# Patient Record
Sex: FEMALE
[De-identification: names, ages, dates, MRNs, and addresses within clinical notes are randomized per-mention and may not be internally consistent; named-entity substitution may affect disease eponyms.]

---

## 2019-03-22 ENCOUNTER — HOSPITAL ENCOUNTER (OUTPATIENT)
Dept: HOSPITAL 56 - MW.OBCHECK | Age: 29
Setting detail: OBSERVATION
LOS: 3 days | Discharge: HOME | End: 2019-03-25
Attending: OBSTETRICS & GYNECOLOGY | Admitting: OBSTETRICS & GYNECOLOGY
Payer: MEDICAID

## 2019-03-22 DIAGNOSIS — Z3A.40: ICD-10-CM

## 2019-03-22 PROCEDURE — 85027 COMPLETE CBC AUTOMATED: CPT

## 2019-03-22 PROCEDURE — 86850 RBC ANTIBODY SCREEN: CPT

## 2019-03-22 PROCEDURE — 86900 BLOOD TYPING SEROLOGIC ABO: CPT

## 2019-03-22 PROCEDURE — 85460 HEMOGLOBIN FETAL: CPT

## 2019-03-22 PROCEDURE — 86901 BLOOD TYPING SEROLOGIC RH(D): CPT

## 2019-03-22 PROCEDURE — 59514 CESAREAN DELIVERY ONLY: CPT

## 2019-03-22 PROCEDURE — 85014 HEMATOCRIT: CPT

## 2019-03-22 PROCEDURE — 85018 HEMOGLOBIN: CPT

## 2019-03-22 PROCEDURE — 36415 COLL VENOUS BLD VENIPUNCTURE: CPT

## 2019-03-22 PROCEDURE — 59025 FETAL NON-STRESS TEST: CPT

## 2019-03-23 RX ADMIN — BUTORPHANOL TARTRATE PRN MG: 1 INJECTION INTRAMUSCULAR; INTRAVENOUS at 01:22

## 2019-03-23 RX ADMIN — BUTORPHANOL TARTRATE PRN MG: 1 INJECTION INTRAMUSCULAR; INTRAVENOUS at 02:36

## 2019-03-23 NOTE — OR
SURGEON:

Jj Hall MD

 

DATE OF PROCEDURE:

 

PREOPERATIVE DIAGNOSIS:

Intrauterine pregnancy, 40 weeks, failure to progress and failure to descend.

 

POSTOPERATIVE DIAGNOSIS:

Intrauterine pregnancy, 40 weeks, failure to progress and failure to descend.

 

OPERATION PERFORMED:

Primary low-transverse  section.

 

ASSISTANT:

OR tech.

 

ANESTHESIA:

Epidural.

 

ANESTHESIOLOGIST:

Dr. Brody Gutierrez and Dr. Martinez.

 

ESTIMATED BLOOD LOSS:

700 mL.

 

COMPLICATIONS:

None.

 

INDICATIONS FOR SURGERY:

This patient is term.  She is followed in the office primarily by me.  She is

admitted in active labor.  At the time of admission, she was 4 cm complete

vertex with bulging bag of water.  She is adry regularly.  The patient

had epidural anesthesia for labor analgesia and she had an artificial membrane

with clear fluid.  The patient progressed rather slowly.  She required Pitocin

augmentation.  However, at 04:00, the patient was complete zero station and she

pushed in excess of 2 hours without any descent of the fetus, so possibility of

CPD and failure to progress, entered pain, and I discussed with the patient and

we elected to have a primary low-transverse  section.

 

PROCEDURE IN DETAIL:

The patient brought to the OR, properly identified.  The patient was brought to

the with a Gayle catheter in the bladder and adequate level of epidural

anesthesia.  The patient prepped and draped in sterile fashion as usual.  Low

transverse Pfannenstiel skin incision was done.  Ricardo fascia and rectus fascia

opened in the direction of the incision.  The 2 recti muscles were  and

peritoneal cavity was entered.  Bladder flap was raised in the usual manner

pushing the bladder away from the lower uterine segment.  Low transverse uterine

incision extended manually and fetus was in the vertex position, delivered

without any problem, and handed to the pediatrician, Dr. Luke who was present at

the time of the delivery.  The fetus cried immediately.  Apgar score reported to

be 8 and 9, and the weight __________ and the placenta delivered spontaneous

complete and intact and repair of the lower uterine segment was done with 2-0

Vicryl continuous interlocking in 2 layers.  Reperitonealization done with 3-0

Vicryl continuous and then the peritoneal cavity evacuated completely from all

blood and blood clot and closed with 3-0 Vicryl continuous.  The rectus fascia

was closed with #1 PDS double strand continuous.  The Ricardo's fascia with 3-0

Vicryl continuous and the skin closed with skin clips, Insorb, and Dermabond.

Instrument and sponge counts were correct x2.  The patient tolerated the

procedure well, went through recovery room in stable general condition.

 

 

AUGUSTINE RHODES

DD:  2019 19:57:05

DT:  2019 20:21:03

Job #:  [id number]

## 2019-03-23 NOTE — PCM.LDHP
L&D History of Present Illness





- General


Date of Service: 03/23/19


Admit Problem/Dx: 


 Patient Status Order with Admit Dx/Problem





03/22/19 20:34


Patient Status [ADT] Routine 








 Admission Diagnosis/Problem











Admission Diagnosis/Problem    Pregnancy-related examination














Source of Information: Patient


History Limitations: Reports: No Limitations





- History of Present Illness


Pain Score: 10


Improves with: Reports: None


Worsens with: Reports: None


Associated Symptoms: Reports: N





- Related Data


Allergies/Adverse Reactions: 


 Allergies











Allergy/AdvReac Type Severity Reaction Status Date / Time


 


No Known Allergies Allergy   Verified 03/22/19 00:08














Past Medical History





- Past Health History


Medical/Surgical History: Denies Medical/Surgical History





- Past Surgical History


HEENT Surgical History: Reports: LASIK


Other HEENT Surgeries/Procedures: Jan 2017





Social & Family History





- Family History


Family Medical History: Noncontributory





- Tobacco Use


Smoking Status *Q: Never Smoker


Second Hand Smoke Exposure: No





- Caffeine Use


Caffeine Use: Reports: Soda





- Recreational Drug Use


Recreational Drug Use: No





H&P Review of Systems





- Review of Systems:


Review Of Systems: See Below


General: Reports: No Symptoms


HEENT: Reports: No Symptoms


Pulmonary: Reports: No Symptoms


Cardiovascular: Reports: No Symptoms


Gastrointestinal: Reports: No Symptoms


Genitourinary: Reports: No Symptoms


Musculoskeletal: Reports: No Symptoms


Skin: Reports: No Symptoms


Psychiatric: Reports: No Symptoms


Neurological: Reports: No Symptoms


Hematologic/Lymphatic: Reports: No Symptoms


Immunologic: Reports: No Symptoms





L&D Exam





- Exam


Exam: See Below





- Vital Signs


Weight: 81.647 kg





- OB Specific


Fundal Height In cm: 38


Contraction Intensity: Moderate


Fetal Movement: Active


Fetal Heart Tones: Present


Birth Presentation: Vertex





- Tracey Score


Tracey Score Cervix Position: Anterior


Tracey Score Effacement: >80%


Tracey Score Dilation: > 5 cm


Tracey Score Infant's Station: -3





- Exam


General: Alert, Oriented


HEENT: PERRLA, Conjunctiva Clear, EACs Clear, EOMI, Hearing Intact, Mucosa 

Moist & Pink, Nares Patent, Normal Nasal Septum, Posterior Pharynx Clear, TMs 

Clear


Neck: Supple, Trachea Midline


Lungs: Clear to Auscultation, Normal Respiratory Effort


Cardiovascular: Regular Rate, Regular Rhythm


GI/Abdominal Exam: Normal Bowel Sounds, Soft, Non-Tender, No Organomegaly, No 

Distention, No Abnormal Bruit, No Mass, Pelvis Stable


Rectal Exam: Normal Exam, Normal Rectal Tone


Genitourinary: Normal external exam, Normal bimanual exam, Normal speculum exam


Back Exam: Normal Inspection, Full Range of Motion


Extremities: Normal Inspection, Normal Range of Motion, Non-Tender, No Pedal 

Edema, Normal Capillary Refill


Skin: Warm, Dry, Intact


Neurological: Cranial Nerves Intact, Reflexes Equal Bilateral


Psychiatric: Alert, Normal Affect, Normal Mood





- Patient Data


Lab Results Last 24 hrs: 


 Laboratory Results - last 24 hr











  03/22/19 03/22/19 Range/Units





  21:00 21:00 


 


WBC  6.88   (4.0-11.0)  K/uL


 


RBC  3.86 L   (4.30-5.90)  M/uL


 


Hgb  11.5 L   (12.0-16.0)  g/dL


 


Hct  33.9 L   (36.0-46.0)  %


 


MCV  87.8   (80.0-98.0)  fL


 


MCH  29.8   (27.0-32.0)  pg


 


MCHC  33.9   (31.0-37.0)  g/dL


 


RDW Std Deviation  43.3   (28.0-62.0)  fl


 


RDW Coeff of Joss  14   (11.0-15.0)  %


 


Plt Count  187   (150-400)  K/uL


 


MPV  10.80   (7.40-12.00)  fL


 


Nucleated RBC %  0.0   /100WBC


 


Nucleated RBCs #  0   K/uL


 


Blood Type   O NEGATIVE  


 


Antibody Screen   NEGATIVE  











Result Diagrams: 


 03/22/19 21:00





Imaging Impressions Last 24 hrs: 





Term pregnancy in active labor.


Problem List Initiated/Reviewed/Updated: Yes


Orders Last 24hrs: 


 Active Orders 24 hr











 Category Date Time Status


 


 Patient Status [ADT] Routine ADT  03/22/19 20:34 Active


 


 Bedrest Bathroom Privileges [RC] ASDIRECTED Care  03/22/19 20:34 Active


 


 Communication Order [RC] ASDIRECTED Care  03/22/19 20:34 Active


 


 Communication Order [RC] ASDIRECTED Care  03/22/19 20:34 Active


 


 Communication Order [RC] ASDIRECTED Care  03/22/19 20:34 Active


 


 Fetal Non Stress Test [RC] PER UNIT ROUTINE Care  03/22/19 20:34 Active


 


 May Shower [RC] ASDIRECTED Care  03/22/19 20:34 Active


 


 Notify Provider [RC] PRN Care  03/22/19 20:34 Active


 


 Notify Provider [RC] PRN Care  03/22/19 20:34 Active


 


 Notify Provider [RC] PRN Care  03/22/19 20:34 Active


 


 Notify Provider [RC] STAT Care  03/22/19 20:34 Active


 


 Oxygen Therapy [RC] ASDIRECTED Care  03/22/19 20:34 Active


 


 Up ad Valerie [RC] ASDIRECTED Care  03/22/19 20:34 Active


 


 Vaginal Exam [RC] PRN Care  03/22/19 20:34 Active


 


 Vital Signs [RC] PER UNIT ROUTINE Care  03/22/19 20:34 Active


 


 Clear Liquid Diet [DIET] Diet  03/23/19 Breakfast Active


 


 Butorphanol [Stadol] Med  03/22/19 20:34 Active





 1 mg IVPUSH ASDIRECTED PRN   


 


 Carboprost Tromethamine [Hemabate DS] Med  03/22/19 20:34 Active





 250 mcg IM ASDIRECTED PRN   


 


 Lactated Ringers [Ringers, Lactated] 1,000 ml Med  03/22/19 20:45 Active





 IV ASDIRECTED   


 


 Lidocaine 1% [Xylocaine 1%] Med  03/22/19 20:34 Active





 50 ml INJECT ONETIME PRN   


 


 Methylergonovine [Methergine] Med  03/22/19 20:34 Active





 0.2 mg IM ASDIRECTED PRN   


 


 Nalbuphine [Nubain] Med  03/22/19 20:34 Active





 10 mg IVPUSH ASDIRECTED PRN   


 


 Oxytocin/0.9 % Sodium Chloride [Oxytocin 30 Unit/500 ML Med  03/22/19 20:45 

Active





 -NS]   





 30 unit in 500 ml IV TITRATE   


 


 Oxytocin/0.9 % Sodium Chloride [Oxytocin 30 Unit/500 ML Med  03/22/19 20:45 

Active





 -NS]   





 30 unit in 500 ml IV TITRATE   


 


 Sodium Chloride 0.9% [Normal Saline] Med  03/22/19 20:34 Active





 10 ml IV ASDIRECTED PRN   


 


 Sodium Chloride 0.9% [Saline Flush] Med  03/22/19 20:34 Active





 10 ml FLUSH ASDIRECTED PRN   


 


 Terbutaline [Brethine] Med  03/22/19 20:34 Active





 0.25 mg SUBCUT ASDIRECTED PRN   


 


 Tranexamic Acid [Cyklokapron] 1,000 mg Med  03/22/19 20:34 Active





 Sodium Chloride 0.9% [Normal Saline] 100 ml   





 IV ONETIME   


 


 Water For Irrigation,Sterile [Sterile Water for Med  03/22/19 20:34 Active





 Irrigation]   





 1,000 ml IRR ASDIRECTED PRN   


 


 miSOPROStol [Cytotec] Med  03/22/19 20:34 Active





 200 mcg PO ONETIME PRN   


 


 miSOPROStol [Cytotec] Med  03/22/19 20:34 Active





 25 mcg VAG ONETIME PRN   


 


 Medication Administration Instruction [OM.PC] Q3H Oth  03/22/19 20:45 Ordered


 


 Peripheral IV Insertion Adult [OM.PC] Routine Oth  03/22/19 20:34 Ordered


 


 Resuscitation Status Routine Resus Stat  03/22/19 20:34 Ordered








 Medication Orders





Butorphanol Tartrate (Stadol)  1 mg IVPUSH ASDIRECTED PRN


   PRN Reason: Pain


   Last Admin: 03/23/19 02:36  Dose: 1 mg


   Admin: 03/23/19 01:22  Dose: 1 mg


Carboprost Tromethamine (Hemabate Ds)  250 mcg IM ASDIRECTED PRN


   PRN Reason: Post Partum Hemorrhage


Lactated Ringer's (Ringers, Lactated)  1,000 mls @ 150 mls/hr IV ASDIRECTED MARCIO


   Last Admin: 03/23/19 02:36  Dose: 999 mls/hr


   Infusion: 03/23/19 02:36  Dose: 150 mls/hr


   Admin: 03/23/19 01:13  Dose: 150 mls/hr


Oxytocin/Sodium Chloride (Oxytocin 30 Unit/500 Ml-Ns)  30 unit in 500 mls @ 999 

mls/hr IV TITRATE MARCIO


Oxytocin/Sodium Chloride (Oxytocin 30 Unit/500 Ml-Ns)  30 unit in 500 mls @ 2 

mls/hr IV TITRATE MARCIO; Protocol


Tranexamic Acid 1,000 mg/ (Sodium Chloride)  110 mls @ 660 mls/hr IV ONETIME PRN


   PRN Reason: Bleeding


Lidocaine HCl (Xylocaine 1%)  50 ml INJECT ONETIME PRN


   PRN Reason: Laceration repair


Methylergonovine Maleate (Methergine)  0.2 mg IM ASDIRECTED PRN


   PRN Reason: Post Partum Hemorrhage


Misoprostol (Cytotec)  200 mcg PO ONETIME PRN


   PRN Reason: Post Partum Hemorrhage


Misoprostol (Cytotec)  25 mcg VAG ONETIME PRN


   PRN Reason: Cervical Ripening


   Last Admin: 03/22/19 21:13  Dose: 25 mcg


Nalbuphine HCl (Nubain)  10 mg IVPUSH ASDIRECTED PRN


   PRN Reason: Pain (severe 7-10)


Sodium Chloride (Saline Flush)  10 ml FLUSH ASDIRECTED PRN


   PRN Reason: Keep Vein Open


Sodium Chloride (Normal Saline)  10 ml IV ASDIRECTED PRN


   PRN Reason: IV Use


Sterile Water (Sterile Water For Irrigation)  1,000 ml IRR ASDIRECTED PRN


   PRN Reason: delivery


Terbutaline Sulfate (Brethine)  0.25 mg SUBCUT ASDIRECTED PRN


   PRN Reason: Tacysystole

## 2019-03-23 NOTE — PCM.PRNOTE
- Free Text/Narrative


Note: 





 active labor, dilated 4cm requesting labor analgesia.


Chart reviewed, discussed procedure, expectations, risks and benefits. ? 

answered. Permit signed.


Fluid bolus: 1900ml


03:20 Prep with chloroprep.


03:23 Skin local 5ml 1% lido @L4-5


03:27 Epidural space on 2nd attempt.  ID'd with 1.5ml saline/1ml air. 3ml 

saline bolus given


03:29 Catheter to 8cm without issues.  Negative aspiration.


03:30 Test dose 5ml 1.5% Lidocaine with 1:200K epi.  TEST NEGATIVE  Occlusive 

drsg/catheter secured.


03:33-7 Epidural bolus with 0.2% Naropin 8ml with Fentanyl 100mcg added


03:52 Infusion started @ 8ml/hr with 4ml/bolus/q10x4/hr.


Pain well controlled.  Resting quietly. Vital signs/FHT stable.


Tolerated procedure well.  No problems noted.

## 2019-03-23 NOTE — PCM.PREANE
Preanesthetic Assessment





- Procedure


Proposed Procedure: 





Labor Epidural





- Anesthesia/Transfusion/Family Hx


Anesthesia History: No Prior Anesthesia


Family History of Anesthesia Reaction: No


Transfusion History: No Prior Transfusion(s)


Intubation History: Unknown





- Review of Systems


General: No Symptoms


Pulmonary: No Symptoms


Cardiovascular: No Symptoms


Gastrointestinal: No Symptoms


Neurological: No Symptoms


Other: Reports: None





- Physical Assessment


NPO Status Date: 03/23/19


NPO Status Time: 03:00


Pulse: 88


O2 Sat by Pulse Oximetry: 99


Respiratory Rate: 20


Blood Pressure: 136/78


Height: 1.68 m


Weight: 81.647 kg


ASA Class: 2 (Acceptable patient for Labor Analgesia.)





- Lab


Values: 





 Laboratory Last Values











WBC  6.88 K/uL (4.0-11.0)   03/22/19  21:00    


 


RBC  3.86 M/uL (4.30-5.90)  L  03/22/19  21:00    


 


Hgb  11.5 g/dL (12.0-16.0)  L  03/22/19  21:00    


 


Hct  33.9 % (36.0-46.0)  L  03/22/19  21:00    


 


MCV  87.8 fL (80.0-98.0)   03/22/19  21:00    


 


MCH  29.8 pg (27.0-32.0)   03/22/19  21:00    


 


MCHC  33.9 g/dL (31.0-37.0)   03/22/19  21:00    


 


RDW Std Deviation  43.3 fl (28.0-62.0)   03/22/19  21:00    


 


RDW Coeff of Joss  14 % (11.0-15.0)   03/22/19  21:00    


 


Plt Count  187 K/uL (150-400)   03/22/19  21:00    


 


MPV  10.80 fL (7.40-12.00)   03/22/19  21:00    


 


Nucleated RBC %  0.0 /100WBC  03/22/19  21:00    


 


Nucleated RBCs #  0 K/uL  03/22/19  21:00    


 


Blood Type  O NEGATIVE   03/22/19  21:00    


 


Antibody Screen  NEGATIVE   03/22/19  21:00    














- Allergies


Allergies/Adverse Reactions: 


 Allergies











Allergy/AdvReac Type Severity Reaction Status Date / Time


 


No Known Allergies Allergy   Verified 03/22/19 00:08














PreAnesthesia Questionnaire





- Past Health History


Medical/Surgical History: Denies Medical/Surgical History





- Past Surgical History


HEENT Surgical History: Reports: LASIK


Other HEENT Surgeries/Procedures: Jan 2017





- SUBSTANCE USE


Smoking Status *Q: Never Smoker


Tobacco Use Within Last Twelve Months: No


Second Hand Smoke Exposure: No


Recreational Drug Use History: No





- CURRENT (IN HOUSE) MEDS


Current Meds: 





 Current Medications





Butorphanol Tartrate (Stadol)  1 mg IVPUSH ASDIRECTED PRN


   PRN Reason: Pain


   Last Admin: 03/23/19 02:36 Dose:  1 mg


Carboprost Tromethamine (Hemabate Ds)  250 mcg IM ASDIRECTED PRN


   PRN Reason: Post Partum Hemorrhage


Lactated Ringer's (Ringers, Lactated)  1,000 mls @ 150 mls/hr IV ASDIRECTED MARCIO


   Last Admin: 03/23/19 03:29 Dose:  999 mls/hr


Oxytocin/Sodium Chloride (Oxytocin 30 Unit/500 Ml-Ns)  30 unit in 500 mls @ 999 

mls/hr IV TITRATE MARCIO


Oxytocin/Sodium Chloride (Oxytocin 30 Unit/500 Ml-Ns)  30 unit in 500 mls @ 2 

mls/hr IV TITRATE MARCIO; Protocol


Tranexamic Acid 1,000 mg/ (Sodium Chloride)  110 mls @ 660 mls/hr IV ONETIME PRN


   PRN Reason: Bleeding


Lidocaine HCl (Xylocaine 1%)  50 ml INJECT ONETIME PRN


   PRN Reason: Laceration repair


Methylergonovine Maleate (Methergine)  0.2 mg IM ASDIRECTED PRN


   PRN Reason: Post Partum Hemorrhage


Misoprostol (Cytotec)  200 mcg PO ONETIME PRN


   PRN Reason: Post Partum Hemorrhage


Misoprostol (Cytotec)  25 mcg VAG ONETIME PRN


   PRN Reason: Cervical Ripening


   Last Admin: 03/22/19 21:13 Dose:  25 mcg


Nalbuphine HCl (Nubain)  10 mg IVPUSH ASDIRECTED PRN


   PRN Reason: Pain (severe 7-10)


Sodium Chloride (Saline Flush)  10 ml FLUSH ASDIRECTED PRN


   PRN Reason: Keep Vein Open


Sodium Chloride (Normal Saline)  10 ml IV ASDIRECTED PRN


   PRN Reason: IV Use


Sterile Water (Sterile Water For Irrigation)  1,000 ml IRR ASDIRECTED PRN


   PRN Reason: delivery


Terbutaline Sulfate (Brethine)  0.25 mg SUBCUT ASDIRECTED PRN


   PRN Reason: Tacysystole





Discontinued Medications





Fentanyl (Sublimaze) Confirm Administered Dose 100 mcg .ROUTE .STK-MED ONE


   Stop: 03/23/19 03:09


Ropivacaine (Naropin 0.2%) Confirm Administered Dose 100 mls @ as directed 

.ROUTE .STK-MED ONE


   Stop: 03/23/19 03:10


Lidocaine/Epinephrine (Lidocaine 1.5%-Epi 1:200,000) Confirm Administered Dose 

5 ml IJ .STK-MED ONE


   Stop: 03/23/19 03:10


Misoprostol (Cytotec)  25 mcg PO ONETIME ONE


   Stop: 03/22/19 20:40


   Last Admin: 03/22/19 21:12 Dose:  25 mcg


Ropivacaine (Naropin 0.2%) Confirm Administered Dose 20 ml .ROUTE .STK-MED ONE


   Stop: 03/23/19 03:09

## 2019-03-23 NOTE — PCM.POSTAN
POST ANESTHESIA ASSESSMENT





- VITAL SIGNS


Pulse Rate: 85


SaO2: 98


Resp Rate: 18


Blood Pressure: 134/78





- RESPIRATORY


Respiratory Status: Respiratory Rate WNL, Airway Patent, O2 Saturation Stable





- CARDIOVASCULAR


CV Status: Pulse Rate WNL, Blood Pressure Stable





- GASTROINTESTINAL


GI Status: No Symptoms





- PAIN


Pain Score: 0





- POST OP HYDRATION


Hydration Status: Adequate & Stable (patient comfoprtable at this time 0 signs 

or symptoms of anesthesia related problems.)

## 2019-03-23 NOTE — PCM.SN
- Free Text/Narrative


Note: 





called to replace epidural bag and changed dose 12/8/15 minute lockout.  pt 9 cm

## 2019-03-24 RX ADMIN — OXYCODONE HYDROCHLORIDE AND ACETAMINOPHEN PRN TAB: 5; 325 TABLET ORAL at 19:23

## 2019-03-24 RX ADMIN — KETOROLAC TROMETHAMINE SCH: 30 INJECTION, SOLUTION INTRAMUSCULAR at 02:22

## 2019-03-24 RX ADMIN — KETOROLAC TROMETHAMINE SCH MG: 30 INJECTION, SOLUTION INTRAMUSCULAR at 14:04

## 2019-03-24 RX ADMIN — KETOROLAC TROMETHAMINE SCH MG: 30 INJECTION, SOLUTION INTRAMUSCULAR at 20:50

## 2019-03-24 RX ADMIN — KETOROLAC TROMETHAMINE SCH MG: 30 INJECTION, SOLUTION INTRAMUSCULAR at 02:07

## 2019-03-24 RX ADMIN — KETOROLAC TROMETHAMINE SCH MG: 30 INJECTION, SOLUTION INTRAMUSCULAR at 07:49

## 2019-03-24 NOTE — PCM.PNPP
- General Info


Date of Service: 19


Functional Status: Reports: Pain Controlled





- Review of Systems


General: Reports: No Symptoms


HEENT: Reports: No Symptoms


Pulmonary: Reports: No Symptoms


Cardiovascular: Reports: No Symptoms


Gastrointestinal: Reports: No Symptoms


Genitourinary: Reports: No Symptoms


Musculoskeletal: Reports: No Symptoms


Skin: Reports: No Symptoms


Neurological: Reports: No Symptoms


Psychiatric: Reports: No Symptoms





- General Info


Date of Service: 19





- Patient Data


Vital Signs - Most Recent: 


 Last Vital Signs











Temp  36.7 C   19 04:00


 


Pulse  75   19 07:00


 


Resp  16   19 08:00


 


BP  102/51 L  19 07:00


 


Pulse Ox  95   19 08:00











Weight - Most Recent: 81.647 kg


I&O - Last 24 Hours: 


 Intake & Output











 19





 22:59 06:59 14:59


 


Intake Total 2100  2


 


Output Total 400 1250 


 


Balance 1700 -1250 2











Lab Results - Last 24 Hours: 


 Laboratory Results - last 24 hr











  19 Range/Units





  20:39 08:05 


 


Hgb   7.2 L  (12.0-16.0)  g/dL


 


Hct   21.2 L  (36.0-46.0)  %


 


Fetal Screen  NEGATIVE   (NEGATIVE)  


 


RhIG Candidate?  YES   


 


Rhogam Indicated  YES, BABY RH POS H   











Med Orders - Current: 


 Current Medications





Bisacodyl (Dulcolax)  10 mg RECTAL ONETIME PRN


   PRN Reason: Constipation


Butorphanol Tartrate (Stadol)  1 mg IVPUSH ASDIRECTED PRN


   PRN Reason: Pain


   Last Admin: 19 02:36 Dose:  1 mg


Carboprost Tromethamine (Hemabate Ds)  250 mcg IM ASDIRECTED PRN


   PRN Reason: Post Partum Hemorrhage


Diphenhydramine HCl (Benadryl)  25 mg IVPUSH Q4H PRN


   PRN Reason: Itching


   Stop: 19 18:41


Diphenhydramine HCl (Benadryl)  25 mg IVPUSH Q6H PRN


   PRN Reason: Itching or Nausea


Docusate Sodium (Colace)  100 mg PO BID [facility]


   Last Admin: 19 09:45 Dose:  100 mg


Emollient Ointment (Lansinoh Hpa)  0 gm TOP ASDIRECTED PRN


   PRN Reason: Sore Nipples


Fentanyl (Sublimaze)  50 mcg IVPUSH Q1H PRN


   PRN Reason: Pain (severe 7-10)


Lactated Ringer's (Ringers, Lactated)  1,000 mls @ 150 mls/hr IV ASDIRECTED [facility]


   Last Admin: 19 08:50 Dose:  999 mls/hr


Oxytocin/Sodium Chloride (Oxytocin 30 Unit/500 Ml-Ns)  30 unit in 500 mls @ 999 

mls/hr IV TITRATE [facility]


Oxytocin/Sodium Chloride (Oxytocin 30 Unit/500 Ml-Ns)  30 unit in 500 mls @ 2 

mls/hr IV TITRATE [facility]; Protocol


   Last Titration: 19 11:24 Dose:  4 munits/min, 4 mls/hr


Tranexamic Acid 1,000 mg/ (Sodium Chloride)  110 mls @ 660 mls/hr IV ONETIME PRN


   PRN Reason: Bleeding


Lactated Ringer's (Ringers, Lactated)  1,000 mls @ 125 mls/hr IV ASDIRECTED [facility]


   Last Admin: 19 01:17 Dose:  125 mls/hr


Ibuprofen (Motrin)  800 mg PO Q8H PRN


   PRN Reason: mild pain or fever


Ketorolac Tromethamine (Toradol)  30 mg IVPUSH Q6H [facility]


   Stop: 19 20:01


   Last Admin: 19 07:49 Dose:  30 mg


Lidocaine HCl (Xylocaine 1%)  50 ml INJECT ONETIME PRN


   PRN Reason: Laceration repair


Methylergonovine Maleate (Methergine)  0.2 mg IM ASDIRECTED PRN


   PRN Reason: Post Partum Hemorrhage


Misoprostol (Cytotec)  200 mcg PO ONETIME PRN


   PRN Reason: Post Partum Hemorrhage


Misoprostol (Cytotec)  25 mcg VAG ONETIME PRN


   PRN Reason: Cervical Ripening


   Last Admin: 19 21:13 Dose:  25 mcg


Nalbuphine HCl (Nubain)  10 mg IVPUSH ASDIRECTED PRN


   PRN Reason: Pain (severe 7-10)


Nalbuphine HCl (Nubain)  5 mg IVPUSH ASDIRECTED PRN


   PRN Reason: Itching


Naloxone HCl (Narcan)  0.1 mg IVPUSH ONETIME PRN


   PRN Reason: Respiratory Depression


   Stop: 19 18:41


Ondansetron HCl (Zofran)  4 mg IVPUSH Q6H PRN


   PRN Reason: Nausea


Ondansetron HCl (Zofran)  4 mg IVPUSH Q4H PRN


   PRN Reason: Nausea/Vomiting


Oxycodone/Acetaminophen (Percocet 325-5 Mg)  2 tab PO Q6H PRN


   PRN Reason: Pain (moderate 4-6)


Oxycodone/Acetaminophen (Percocet 325-5 Mg)  1 tab PO Q4H PRN


   PRN Reason: Pain (moderate 4-6)


Oxycodone/Acetaminophen (Percocet 325-5 Mg)  2 tab PO Q4H PRN


   PRN Reason: Pain (moderate 4-6)


Sodium Chloride (Saline Flush)  10 ml FLUSH ASDIRECTED PRN


   PRN Reason: Keep Vein Open


Sodium Chloride (Normal Saline)  10 ml IV ASDIRECTED PRN


   PRN Reason: IV Use


Sterile Water (Sterile Water For Irrigation)  1,000 ml IRR ASDIRECTED PRN


   PRN Reason: delivery


Terbutaline Sulfate (Brethine)  0.25 mg SUBCUT ASDIRECTED PRN


   PRN Reason: Tacysystole





Discontinued Medications





Dexamethasone (Dexamethasone) Confirm Administered Dose 20 mg .ROUTE .STK-MED 

ONE


   Stop: 19 19:24


Fentanyl (Sublimaze) Confirm Administered Dose 100 mcg .ROUTE .STK-MED ONE


   Stop: 19 03:09


Ropivacaine (Naropin 0.2%) Confirm Administered Dose 100 mls @ as directed 

.ROUTE .STK-MED ONE


   Stop: 19 03:10


Fentanyl/Bupivacaine HCl (Fentanyl-Bupiv-Ns 2 Mcg/Ml-0.125%) Confirm 

Administered Dose 100 mls @ as directed .ROUTE .STK-MED ONE


   Stop: 19 14:47


Ibuprofen (Motrin)  800 mg PO Q8H PRN


   PRN Reason: mild pain or fever


Ketorolac Tromethamine (Toradol) Confirm Administered Dose 60 mg .ROUTE .STK-

MED ONE


   Stop: 19 19:24


Lidocaine (Xylocaine-Mpf 2%) Confirm Administered Dose 15 ml .ROUTE .STK-MED ONE


   Stop: 19 18:24


Lidocaine/Epinephrine (Lidocaine 1.5%-Epi 1:200,000) Confirm Administered Dose 

5 ml IJ .STK-MED ONE


   Stop: 19 03:10


Midazolam HCl (Versed 1 Mg/Ml) Confirm Administered Dose 2 mg .ROUTE .STK-MED 

ONE


   Stop: 19 19:32


Misoprostol (Cytotec)  25 mcg PO ONETIME ONE


   Stop: 19 20:40


   Last Admin: 19 21:12 Dose:  25 mcg


Morphine Sulfate (Duramorph Pf) Confirm Administered Dose 10 mg .ROUTE .STK-MED 

ONE


   Stop: 19 18:31


Octyl Cyanoacrylate (Dermabond Advance) Confirm Administered Dose 1 applic 

.ROUTE .STK-MED ONE


   Stop: 19 19:44


Ondansetron HCl (Zofran) Confirm Administered Dose 8 mg .ROUTE .STK-MED ONE


   Stop: 19 19:24


Oxytocin (Pitocin) Confirm Administered Dose 30 unit .ROUTE .STK-MED ONE


   Stop: 19 19:24


Propofol (Diprivan  20 Ml) Confirm Administered Dose 200 mg .ROUTE .STK-MED ONE


   Stop: 19 19:24


Ropivacaine (Naropin 0.2%) Confirm Administered Dose 20 ml .ROUTE .STK-MED ONE


   Stop: 19 03:09











- Infant Interaction


Infant Disposition, Postpartum: [place] in Room with Family


Infant Interaction: Holding Infant


Infant Feeding: Attempted Breastfeeding; Nursed Fair/Poor


Support Person: Sister, Other (see below)





- Postpartum Recovery Exam


Fundal Tone: Firm


Fundal Level: At Umbilicus


Fundal Placement: Midline


Lochia Amount: Small


Lochia Color: Rubra/Red


Perineum Description: Intact, Minimal Bruising/Swelling


Episiotomy/Laceration: Approximated


Bladder Status: Indwelling Catheter in Place


Urinary Elimination: Indwelling Catheter





- Exam


General: Alert, Oriented


HEENT: Pupils Equal


Neck: Supple


Lungs: Clear to Auscultation, Normal Respiratory Effort


Cardiovascular: Regular Rate, Regular Rhythm


GI/Abdominal Exam: Normal Bowel Sounds, Soft, Non-Tender, No Organomegaly, No 

Distention, No Abnormal Bruit, No Mass, Pelvis Stable


Extremities: Normal Inspection, Normal Range of Motion, Non-Tender, No Pedal 

Edema, Normal Capillary Refill


Skin: Warm, Dry, Intact


Wound/Incisions: Healing Well


Neurological: No New Focal Deficit


Psy/Mental Status: Alert, Normal Affect, Normal Mood





- Problem List Review


Problem List Initiated/Reviewed/Updated: Yes





- My Orders


Last 24 Hours: 


My Active Orders





19 19:49


Acetaminophen/oxyCODONE [Percocet 325-5 MG]   1 tab PO Q4H PRN 


Acetaminophen/oxyCODONE [Percocet 325-5 MG]   2 tab PO Q4H PRN 


Bisacodyl [Dulcolax]   10 mg RECTAL ONETIME PRN 


Lanolin [Lansinoh HPA]   See Dose Instructions  TOP ASDIRECTED PRN 


Ondansetron [Zofran]   4 mg IVPUSH Q4H PRN 


diphenhydrAMINE [Benadryl]   25 mg IVPUSH Q6H PRN 





19 19:50


Patient Status [ADT] Routine 


Ambulate [RC] PER UNIT ROUTINE 


Antiembolic Devices [RC] PER UNIT ROUTINE 


Communication Order [RC] PER UNIT ROUTINE 


May Shower [RC] ASDIRECTED 


RT Incentive Spirometry [RC] Q2HWA 


Vital Signs [RC] PER UNIT ROUTINE 


Assess Lochia [WOMSER] Per Unit Routine 


Assess Uterine Involution [WOMSER] Per Unit Routine 


Breast Pump [WOMSER] Per Unit Routine 


Peripheral IV Discontinue [OM.PC] Routine 


Sequential Compression Device [OM.PC] Per Unit Routine 





19 20:00


Ketorolac [Toradol]   30 mg IVPUSH Q6H 


Lactated Ringers [Ringers, Lactated] 1,000 ml IV ASDIRECTED 





19 21:00


Docusate Sodium [Colace]   100 mg PO BID 





19 Breakfast


Regular Diet [DIET] 





19 02:00


Ibuprofen [Motrin]   800 mg PO Q8H PRN 














- Assessment


Assessment:: 





S/P primary C/section doing well





- Plan


Plan:: 





Regular postpartum care.

## 2019-03-25 RX ADMIN — OXYCODONE HYDROCHLORIDE AND ACETAMINOPHEN PRN TAB: 5; 325 TABLET ORAL at 06:29

## 2019-03-25 NOTE — PCM.DCSUM1
**Discharge Summary





- Hospital Course


Diagnosis: Stroke: No





- Discharge Data


Discharge Date: 03/25/19


Discharge Disposition: Home, Self-Care 01


Condition: Good





- Patient Summary/Data


Operative Procedure(s) Performed: Primary C/section.





- Patient Instructions


Diet: Usual Diet as Tolerated


Activity: As Tolerated


Driving: Do Not Drive


Showering/Bathing: May Shower


Wound/Incision Care: Keep Operative Site/Wound Site Clean and Dry





- Discharge Plan





- Discharge Summary/Plan Comment


DC Time >30 min.: Yes





- General Info


Date of Service: 03/25/19


Functional Status: Reports: Pain Controlled





- Review of Systems


General: Reports: No Symptoms


HEENT: Reports: No Symptoms


Pulmonary: Reports: No Symptoms


Cardiovascular: Reports: No Symptoms


Gastrointestinal: Reports: No Symptoms


Genitourinary: Reports: No Symptoms


Musculoskeletal: Reports: No Symptoms


Skin: Reports: No Symptoms


Neurological: Reports: No Symptoms


Psychiatric: Reports: No Symptoms





- Patient Data


Vitals - Most Recent: 


 Last Vital Signs











Temp  36.5 C   03/25/19 04:45


 


Pulse  76   03/25/19 04:45


 


Resp  16   03/25/19 04:45


 


BP  126/74   03/25/19 04:45


 


Pulse Ox  98   03/25/19 04:45











Weight - Most Recent: 81.647 kg


I&O - Last 24 hours: 


 Intake & Output











 03/24/19 03/25/19 03/25/19





 22:59 06:59 14:59


 


Output Total 850  


 


Balance -850  











Lab Results - Last 24 hrs: 


 Laboratory Results - last 24 hr











  03/23/19 Range/Units





  20:39 


 


Fetal Screen  NEGATIVE  (NEGATIVE)  


 


RhIG Candidate?  YES  


 


Rhogam Indicated  YES, BABY RH POS H  











Med Orders - Current: 


 Current Medications





Bisacodyl (Dulcolax)  10 mg RECTAL ONETIME PRN


   PRN Reason: Constipation


Butorphanol Tartrate (Stadol)  1 mg IVPUSH ASDIRECTED PRN


   PRN Reason: Pain


   Last Admin: 03/23/19 02:36 Dose:  1 mg


Carboprost Tromethamine (Hemabate Ds)  250 mcg IM ASDIRECTED PRN


   PRN Reason: Post Partum Hemorrhage


Diphenhydramine HCl (Benadryl)  25 mg IVPUSH Q6H PRN


   PRN Reason: Itching or Nausea


Docusate Sodium (Colace)  100 mg PO BID [facility]


   Last Admin: 03/24/19 21:05 Dose:  100 mg


Emollient Ointment (Lansinoh Hpa)  0 gm TOP ASDIRECTED PRN


   PRN Reason: Sore Nipples


Fentanyl (Sublimaze)  50 mcg IVPUSH Q1H PRN


   PRN Reason: Pain (severe 7-10)


Lactated Ringer's (Ringers, Lactated)  1,000 mls @ 150 mls/hr IV ASDIRECTED [facility]


   Last Admin: 03/23/19 08:50 Dose:  999 mls/hr


Oxytocin/Sodium Chloride (Oxytocin 30 Unit/500 Ml-Ns)  30 unit in 500 mls @ 999 

mls/hr IV TITRATE [facility]


Oxytocin/Sodium Chloride (Oxytocin 30 Unit/500 Ml-Ns)  30 unit in 500 mls @ 2 

mls/hr IV TITRATE [facility]; Protocol


   Last Titration: 03/23/19 11:24 Dose:  4 munits/min, 4 mls/hr


Tranexamic Acid 1,000 mg/ (Sodium Chloride)  110 mls @ 660 mls/hr IV ONETIME PRN


   PRN Reason: Bleeding


Lactated Ringer's (Ringers, Lactated)  1,000 mls @ 125 mls/hr IV ASDIRECTED [facility]


   Last Admin: 03/24/19 01:17 Dose:  125 mls/hr


Ibuprofen (Motrin)  800 mg PO Q8H PRN


   PRN Reason: mild pain or fever


Lidocaine HCl (Xylocaine 1%)  50 ml INJECT ONETIME PRN


   PRN Reason: Laceration repair


Methylergonovine Maleate (Methergine)  0.2 mg IM ASDIRECTED PRN


   PRN Reason: Post Partum Hemorrhage


Misoprostol (Cytotec)  200 mcg PO ONETIME PRN


   PRN Reason: Post Partum Hemorrhage


Misoprostol (Cytotec)  25 mcg VAG ONETIME PRN


   PRN Reason: Cervical Ripening


   Last Admin: 03/22/19 21:13 Dose:  25 mcg


Nalbuphine HCl (Nubain)  10 mg IVPUSH ASDIRECTED PRN


   PRN Reason: Pain (severe 7-10)


Nalbuphine HCl (Nubain)  5 mg IVPUSH ASDIRECTED PRN


   PRN Reason: Itching


Ondansetron HCl (Zofran)  4 mg IVPUSH Q6H PRN


   PRN Reason: Nausea


Ondansetron HCl (Zofran)  4 mg IVPUSH Q4H PRN


   PRN Reason: Nausea/Vomiting


Oxycodone/Acetaminophen (Percocet 325-5 Mg)  2 tab PO Q6H PRN


   PRN Reason: Pain (moderate 4-6)


Oxycodone/Acetaminophen (Percocet 325-5 Mg)  1 tab PO Q4H PRN


   PRN Reason: Pain (moderate 4-6)


   Last Admin: 03/25/19 06:29 Dose:  1 tab


Oxycodone/Acetaminophen (Percocet 325-5 Mg)  2 tab PO Q4H PRN


   PRN Reason: Pain (moderate 4-6)


Sodium Chloride (Saline Flush)  10 ml FLUSH ASDIRECTED PRN


   PRN Reason: Keep Vein Open


Sodium Chloride (Normal Saline)  10 ml IV ASDIRECTED PRN


   PRN Reason: IV Use


Sterile Water (Sterile Water For Irrigation)  1,000 ml IRR ASDIRECTED PRN


   PRN Reason: delivery


Terbutaline Sulfate (Brethine)  0.25 mg SUBCUT ASDIRECTED PRN


   PRN Reason: Tacysystole





Discontinued Medications





Dexamethasone (Dexamethasone) Confirm Administered Dose 20 mg .ROUTE .STK-MED 

ONE


   Stop: 03/23/19 19:24


Diphenhydramine HCl (Benadryl)  25 mg IVPUSH Q4H PRN


   PRN Reason: Itching


   Stop: 03/24/19 18:41


Fentanyl (Sublimaze) Confirm Administered Dose 100 mcg .ROUTE .STK-MED ONE


   Stop: 03/23/19 03:09


Ropivacaine (Naropin 0.2%) Confirm Administered Dose 100 mls @ as directed 

.ROUTE .STK-MED ONE


   Stop: 03/23/19 03:10


Fentanyl/Bupivacaine HCl (Fentanyl-Bupiv-Ns 2 Mcg/Ml-0.125%) Confirm 

Administered Dose 100 mls @ as directed .ROUTE .STK-MED ONE


   Stop: 03/23/19 14:47


Ibuprofen (Motrin)  800 mg PO Q8H PRN


   PRN Reason: mild pain or fever


Ketorolac Tromethamine (Toradol) Confirm Administered Dose 60 mg .ROUTE .STK-

MED ONE


   Stop: 03/23/19 19:24


Ketorolac Tromethamine (Toradol)  30 mg IVPUSH Q6H [facility]


   Stop: 03/24/19 20:01


   Last Admin: 03/24/19 20:50 Dose:  30 mg


Lidocaine (Xylocaine-Mpf 2%) Confirm Administered Dose 15 ml .ROUTE .STK-MED ONE


   Stop: 03/23/19 18:24


Lidocaine/Epinephrine (Lidocaine 1.5%-Epi 1:200,000) Confirm Administered Dose 

5 ml IJ .STK-MED ONE


   Stop: 03/23/19 03:10


Midazolam HCl (Versed 1 Mg/Ml) Confirm Administered Dose 2 mg .ROUTE .STK-MED 

ONE


   Stop: 03/23/19 19:32


Misoprostol (Cytotec)  25 mcg PO ONETIME ONE


   Stop: 03/22/19 20:40


   Last Admin: 03/22/19 21:12 Dose:  25 mcg


Morphine Sulfate (Duramorph Pf) Confirm Administered Dose 10 mg .ROUTE .STK-MED 

ONE


   Stop: 03/23/19 18:31


Naloxone HCl (Narcan)  0.1 mg IVPUSH ONETIME PRN


   PRN Reason: Respiratory Depression


   Stop: 03/24/19 18:41


Octyl Cyanoacrylate (Dermabond Advance) Confirm Administered Dose 1 applic 

.ROUTE .STK-MED ONE


   Stop: 03/23/19 19:44


Ondansetron HCl (Zofran) Confirm Administered Dose 8 mg .ROUTE .STK-MED ONE


   Stop: 03/23/19 19:24


Oxytocin (Pitocin) Confirm Administered Dose 30 unit .ROUTE .STK-MED ONE


   Stop: 03/23/19 19:24


Propofol (Diprivan  20 Ml) Confirm Administered Dose 200 mg .ROUTE .STK-MED ONE


   Stop: 03/23/19 19:24


Ropivacaine (Naropin 0.2%) Confirm Administered Dose 20 ml .ROUTE .STK-MED ONE


   Stop: 03/23/19 03:09











- Exam


General: Reports: Alert, Oriented


HEENT: Reports: Pupils Equal, Pupils Reactive, EOMI, Mucous Membr. Moist/Pink


Neck: Reports: Supple


Lungs: Reports: Clear to Auscultation, Normal Respiratory Effort


Cardiovascular: Reports: Regular Rate, Regular Rhythm


GI/Abdominal Exam: Normal Bowel Sounds, Soft, Non-Tender, No Organomegaly, No 

Distention, No Abnormal Bruit, No Mass, Pelvis Stable


 (Female) Exam: Normal External Exam, Normal Speculum Exam, Normal Bimanual 

Exam


Rectal (Female) Exam: Normal Exam, Normal Rectal Tone


Back Exam: Reports: Normal Inspection, Full Range of Motion


Extremities: Normal Inspection, Normal Range of Motion, Non-Tender, No Pedal 

Edema, Normal Capillary Refill


Skin: Reports: Warm, Dry, Intact


Wound/Incisions: Reports: Healing Well


Neurological: Reports: No New Focal Deficit


Psy/Mental Status: Reports: Alert, Normal Affect, Normal Mood

## 2019-03-25 NOTE — PCM.PNPP
- General Info


Date of Service: 19


Functional Status: Reports: Pain Controlled





- Review of Systems


General: Reports: No Symptoms


HEENT: Reports: No Symptoms


Pulmonary: Reports: No Symptoms


Cardiovascular: Reports: No Symptoms


Gastrointestinal: Reports: No Symptoms


Genitourinary: Reports: No Symptoms


Musculoskeletal: Reports: No Symptoms


Skin: Reports: No Symptoms


Neurological: Reports: No Symptoms


Psychiatric: Reports: No Symptoms





- General Info


Date of Service: 19





- Patient Data


Vital Signs - Most Recent: 


 Last Vital Signs











Temp  36.5 C   19 04:45


 


Pulse  76   19 04:45


 


Resp  16   19 04:45


 


BP  126/74   19 04:45


 


Pulse Ox  98   19 04:45











Weight - Most Recent: 81.647 kg


I&O - Last 24 Hours: 


 Intake & Output











 19





 22:59 06:59 14:59


 


Output Total 850  


 


Balance -850  











Lab Results - Last 24 Hours: 


 Laboratory Results - last 24 hr











  19 Range/Units





  20:39 


 


Fetal Screen  NEGATIVE  (NEGATIVE)  


 


RhIG Candidate?  YES  


 


Rhogam Indicated  YES, BABY RH POS H  











Med Orders - Current: 


 Current Medications





Bisacodyl (Dulcolax)  10 mg RECTAL ONETIME PRN


   PRN Reason: Constipation


Butorphanol Tartrate (Stadol)  1 mg IVPUSH ASDIRECTED PRN


   PRN Reason: Pain


   Last Admin: 19 02:36 Dose:  1 mg


Carboprost Tromethamine (Hemabate Ds)  250 mcg IM ASDIRECTED PRN


   PRN Reason: Post Partum Hemorrhage


Diphenhydramine HCl (Benadryl)  25 mg IVPUSH Q6H PRN


   PRN Reason: Itching or Nausea


Docusate Sodium (Colace)  100 mg PO BID [facility]


   Last Admin: 19 21:05 Dose:  100 mg


Emollient Ointment (Lansinoh Hpa)  0 gm TOP ASDIRECTED PRN


   PRN Reason: Sore Nipples


Fentanyl (Sublimaze)  50 mcg IVPUSH Q1H PRN


   PRN Reason: Pain (severe 7-10)


Lactated Ringer's (Ringers, Lactated)  1,000 mls @ 150 mls/hr IV ASDIRECTED [facility]


   Last Admin: 19 08:50 Dose:  999 mls/hr


Oxytocin/Sodium Chloride (Oxytocin 30 Unit/500 Ml-Ns)  30 unit in 500 mls @ 999 

mls/hr IV TITRATE [facility]


Oxytocin/Sodium Chloride (Oxytocin 30 Unit/500 Ml-Ns)  30 unit in 500 mls @ 2 

mls/hr IV TITRATE [facility]; Protocol


   Last Titration: 19 11:24 Dose:  4 munits/min, 4 mls/hr


Tranexamic Acid 1,000 mg/ (Sodium Chloride)  110 mls @ 660 mls/hr IV ONETIME PRN


   PRN Reason: Bleeding


Lactated Ringer's (Ringers, Lactated)  1,000 mls @ 125 mls/hr IV ASDIRECTED [facility]


   Last Admin: 19 01:17 Dose:  125 mls/hr


Ibuprofen (Motrin)  800 mg PO Q8H PRN


   PRN Reason: mild pain or fever


Lidocaine HCl (Xylocaine 1%)  50 ml INJECT ONETIME PRN


   PRN Reason: Laceration repair


Methylergonovine Maleate (Methergine)  0.2 mg IM ASDIRECTED PRN


   PRN Reason: Post Partum Hemorrhage


Misoprostol (Cytotec)  200 mcg PO ONETIME PRN


   PRN Reason: Post Partum Hemorrhage


Misoprostol (Cytotec)  25 mcg VAG ONETIME PRN


   PRN Reason: Cervical Ripening


   Last Admin: 19 21:13 Dose:  25 mcg


Nalbuphine HCl (Nubain)  10 mg IVPUSH ASDIRECTED PRN


   PRN Reason: Pain (severe 7-10)


Nalbuphine HCl (Nubain)  5 mg IVPUSH ASDIRECTED PRN


   PRN Reason: Itching


Ondansetron HCl (Zofran)  4 mg IVPUSH Q6H PRN


   PRN Reason: Nausea


Ondansetron HCl (Zofran)  4 mg IVPUSH Q4H PRN


   PRN Reason: Nausea/Vomiting


Oxycodone/Acetaminophen (Percocet 325-5 Mg)  2 tab PO Q6H PRN


   PRN Reason: Pain (moderate 4-6)


Oxycodone/Acetaminophen (Percocet 325-5 Mg)  1 tab PO Q4H PRN


   PRN Reason: Pain (moderate 4-6)


   Last Admin: 19 06:29 Dose:  1 tab


Oxycodone/Acetaminophen (Percocet 325-5 Mg)  2 tab PO Q4H PRN


   PRN Reason: Pain (moderate 4-6)


Sodium Chloride (Saline Flush)  10 ml FLUSH ASDIRECTED PRN


   PRN Reason: Keep Vein Open


Sodium Chloride (Normal Saline)  10 ml IV ASDIRECTED PRN


   PRN Reason: IV Use


Sterile Water (Sterile Water For Irrigation)  1,000 ml IRR ASDIRECTED PRN


   PRN Reason: delivery


Terbutaline Sulfate (Brethine)  0.25 mg SUBCUT ASDIRECTED PRN


   PRN Reason: Tacysystole





Discontinued Medications





Dexamethasone (Dexamethasone) Confirm Administered Dose 20 mg .ROUTE .STK-MED 

ONE


   Stop: 19 19:24


Diphenhydramine HCl (Benadryl)  25 mg IVPUSH Q4H PRN


   PRN Reason: Itching


   Stop: 19 18:41


Fentanyl (Sublimaze) Confirm Administered Dose 100 mcg .ROUTE .STK-MED ONE


   Stop: 19 03:09


Ropivacaine (Naropin 0.2%) Confirm Administered Dose 100 mls @ as directed 

.ROUTE .STK-MED ONE


   Stop: 19 03:10


Fentanyl/Bupivacaine HCl (Fentanyl-Bupiv-Ns 2 Mcg/Ml-0.125%) Confirm 

Administered Dose 100 mls @ as directed .ROUTE .STK-MED ONE


   Stop: 19 14:47


Ibuprofen (Motrin)  800 mg PO Q8H PRN


   PRN Reason: mild pain or fever


Ketorolac Tromethamine (Toradol) Confirm Administered Dose 60 mg .ROUTE .STK-

MED ONE


   Stop: 19 19:24


Ketorolac Tromethamine (Toradol)  30 mg IVPUSH Q6H MARCIO


   Stop: 19 20:01


   Last Admin: 19 20:50 Dose:  30 mg


Lidocaine (Xylocaine-Mpf 2%) Confirm Administered Dose 15 ml .ROUTE .STK-MED ONE


   Stop: 19 18:24


Lidocaine/Epinephrine (Lidocaine 1.5%-Epi 1:200,000) Confirm Administered Dose 

5 ml IJ .STK-MED ONE


   Stop: 19 03:10


Midazolam HCl (Versed 1 Mg/Ml) Confirm Administered Dose 2 mg .ROUTE .STK-MED 

ONE


   Stop: 19 19:32


Misoprostol (Cytotec)  25 mcg PO ONETIME ONE


   Stop: 19 20:40


   Last Admin: 19 21:12 Dose:  25 mcg


Morphine Sulfate (Duramorph Pf) Confirm Administered Dose 10 mg .ROUTE .STK-MED 

ONE


   Stop: 19 18:31


Naloxone HCl (Narcan)  0.1 mg IVPUSH ONETIME PRN


   PRN Reason: Respiratory Depression


   Stop: 19 18:41


Octyl Cyanoacrylate (Dermabond Advance) Confirm Administered Dose 1 applic 

.ROUTE .STK-MED ONE


   Stop: 19 19:44


Ondansetron HCl (Zofran) Confirm Administered Dose 8 mg .ROUTE .STK-MED ONE


   Stop: 19 19:24


Oxytocin (Pitocin) Confirm Administered Dose 30 unit .ROUTE .STK-MED ONE


   Stop: 19 19:24


Propofol (Diprivan  20 Ml) Confirm Administered Dose 200 mg .ROUTE .STK-MED ONE


   Stop: 19 19:24


Ropivacaine (Naropin 0.2%) Confirm Administered Dose 20 ml .ROUTE .STK-MED ONE


   Stop: 19 03:09











- Infant Interaction


Infant Disposition, Postpartum: Egnar in Room with Family


Infant Interaction: Holding Infant


Infant Feeding: Attempted Breastfeeding; Nursed Fair/Poor


Support Person: Sister, Other (see below)





- Postpartum Recovery Exam


Fundal Tone: Firm


Fundal Level: At Umbilicus


Fundal Placement: Midline


Lochia Amount: Scant


Lochia Color: Rubra/Red


Perineum Description: Intact, Minimal Bruising/Swelling


Episiotomy/Laceration: None


Bladder Status: Nonpalpable


Urinary Elimination: Voided





- Exam


General: Alert, Oriented


HEENT: Pupils Equal


Neck: Supple


Lungs: Clear to Auscultation, Normal Respiratory Effort


Cardiovascular: Regular Rate, Regular Rhythm


GI/Abdominal Exam: Normal Bowel Sounds, Soft, Non-Tender, No Organomegaly, No 

Distention, No Abnormal Bruit, No Mass, Pelvis Stable


Extremities: Normal Inspection, Normal Range of Motion, Non-Tender, No Pedal 

Edema, Normal Capillary Refill


Skin: Warm, Dry, Intact


Wound/Incisions: Healing Well


Neurological: No New Focal Deficit


Psy/Mental Status: Alert, Normal Affect, Normal Mood





- Problem List Review


Problem List Initiated/Reviewed/Updated: Yes





- My Orders


Last 24 Hours: 


My Active Orders





19 02:00


Ibuprofen [Motrin]   800 mg PO Q8H PRN 














- Assessment


Assessment:: 





S/P primary C/section doing well





- Plan


Plan:: 





Regular postpartum care.

## 2019-03-25 NOTE — [id number]
Post Anesthesia Note





- EVALUATION WITHIN 48HRS OF ANESTHETIC


Vital Signs in Normal Range: Yes


Patient Participated in Evaluation: Yes


Respiratory Function Stable: Yes


Airway Patent: Yes


Cardiovascular Function Stable: Yes


Hydration Status Stable: Yes


Pain Control Satisfactory: Yes


Nausea and Vomiting Control Satisfactory: Yes


Mental Status Recovered: Yes


Pulse Rate: 85


Resp Rate: 18


Blood Pressure: 134/78





- COMMENTS/OBSERVATIONS


Free Text/Narrative:: 





no anesthesia problems

## 2024-03-09 NOTE — PCM.OPNOTE
- General Post-Op/Procedure Note


Date of Surgery/Procedure: 03/23/19


Operative Procedure(s): Primary C/section.


Pre Op Diagnosis: IUP 40 wks failiar to progress


Post-Op Diagnosis: Same


Anesthesia Technique: Epidural


Primary Surgeon: Jj Hall


EBL in mLs: 700


Complications: None


Condition: Good normal...